# Patient Record
Sex: FEMALE | Race: AMERICAN INDIAN OR ALASKA NATIVE
[De-identification: names, ages, dates, MRNs, and addresses within clinical notes are randomized per-mention and may not be internally consistent; named-entity substitution may affect disease eponyms.]

---

## 2020-03-13 ENCOUNTER — HOSPITAL ENCOUNTER (EMERGENCY)
Dept: HOSPITAL 5 - ED | Age: 40
Discharge: HOME | End: 2020-03-13
Payer: SELF-PAY

## 2020-03-13 VITALS — SYSTOLIC BLOOD PRESSURE: 155 MMHG | DIASTOLIC BLOOD PRESSURE: 102 MMHG

## 2020-03-13 DIAGNOSIS — M54.41: Primary | ICD-10-CM

## 2020-03-13 PROCEDURE — 99282 EMERGENCY DEPT VISIT SF MDM: CPT

## 2020-03-13 NOTE — EMERGENCY DEPARTMENT REPORT
Chief Complaint: Back Pain/Injury


Stated Complaint: BACK PAIN


Time Seen by Provider: 03/13/20 11:44





- HPI


History of Present Illness: 





40 y/o with chronic history of back pain presents c/o of atraumatic flare up of 

lower back pain that radiates to legs. Thinks her hernitated disk is acting up. 

No loss of bowel or bladder and IS ambulatory. 





- ROS


Review of Systems: 





as per hpi





- Exam


Vital Signs: 


                                   Vital Signs











  03/13/20





  11:28


 


Temperature 98 F


 


Pulse Rate 102 H


 


Respiratory 16





Rate 


 


Blood Pressure 155/102





[Left] 


 


O2 Sat by Pulse 97





Oximetry 











Physical Exam: 





lumbar negative seated slr. pt ambulatory. - clonus gait steady coordinated and 

smooth. 


MSE screening note: 


Focused history and physical exam performed.


Due to findings the following was ordered:





recommend OTC medications and lumbar excercises. Also f/u at Urgent care or walk

 in clinic. 





ED Disposition for MSE


Clinical Impression: 


 Lumbago with sciatica, right side





Disposition: DC-01 TO HOME OR SELFCARE


Condition: Stable


Instructions:  Lumbar Radiculopathy (ED), Ice Pack Application (ED)


Additional Instructions: 


At this present time you are not having and emergency medical condition. 

We.Recommend OTC nsaids. Also follow up with pcp or urgent care. 





Referrals: 


[facility] [Provider Group] - 3-5 Days


[facility] [Outside] - 3-5 Days